# Patient Record
Sex: MALE | Race: BLACK OR AFRICAN AMERICAN
[De-identification: names, ages, dates, MRNs, and addresses within clinical notes are randomized per-mention and may not be internally consistent; named-entity substitution may affect disease eponyms.]

---

## 2024-09-25 ENCOUNTER — NON-APPOINTMENT (OUTPATIENT)
Age: 59
End: 2024-09-25

## 2024-09-26 ENCOUNTER — NON-APPOINTMENT (OUTPATIENT)
Age: 59
End: 2024-09-26

## 2024-09-26 ENCOUNTER — APPOINTMENT (OUTPATIENT)
Dept: UROLOGY | Facility: CLINIC | Age: 59
End: 2024-09-26
Payer: SELF-PAY

## 2024-09-26 VITALS
WEIGHT: 180 LBS | SYSTOLIC BLOOD PRESSURE: 134 MMHG | HEART RATE: 74 BPM | OXYGEN SATURATION: 99 % | DIASTOLIC BLOOD PRESSURE: 92 MMHG

## 2024-09-26 DIAGNOSIS — Z80.42 FAMILY HISTORY OF MALIGNANT NEOPLASM OF PROSTATE: ICD-10-CM

## 2024-09-26 DIAGNOSIS — R93.5 ABNORMAL FINDINGS ON DIAGNOSTIC IMAGING OF OTHER ABDOMINAL REGIONS, INCLUDING RETROPERITONEUM: ICD-10-CM

## 2024-09-26 DIAGNOSIS — R97.20 ELEVATED PROSTATE, SPECIFIC ANTIGEN [PSA]: ICD-10-CM

## 2024-09-26 DIAGNOSIS — E78.00 PURE HYPERCHOLESTEROLEMIA, UNSPECIFIED: ICD-10-CM

## 2024-09-26 PROBLEM — Z00.00 ENCOUNTER FOR PREVENTIVE HEALTH EXAMINATION: Status: ACTIVE | Noted: 2024-09-26

## 2024-09-26 PROCEDURE — G2211 COMPLEX E/M VISIT ADD ON: CPT

## 2024-09-26 PROCEDURE — 99204 OFFICE O/P NEW MOD 45 MIN: CPT

## 2024-09-26 RX ORDER — SIMVASTATIN 20 MG/1
20 TABLET, FILM COATED ORAL
Refills: 0 | Status: ACTIVE | COMMUNITY

## 2024-09-29 PROBLEM — R93.5 ABNORMAL MRI, PELVIS: Status: ACTIVE | Noted: 2024-09-29

## 2024-09-29 PROBLEM — R97.20 ELEVATED PSA: Status: ACTIVE | Noted: 2024-09-29

## 2024-09-29 NOTE — HISTORY OF PRESENT ILLNESS
[FreeTextEntry1] : KIMMIE WILHELM presents to the office today. He is a 59 year-old [facility] man with an elevated PSA of 8.72ng/mL drawn in January. He saw Dr. Brennan in [facility] and his PSA 9.74ng/mL with 14.9% free fraction. In June his PSA was 10.99ng/mL.  MRI was completed in February which showed a prostate volume of 49.4mL with a PI-RADS 3 lesion. He had a biopsy done in [place] about 3 weeks ago but has not yet received the results.   March 2022 his PSA was 4.22ng/mL  His brother is going through the same situation right now. He does not recall any family history of prostate cancer.   He reports a moderate stream with occasional straining.   Denies any hematuria.   He goes back and forth to [place].

## 2024-09-29 NOTE — HISTORY OF PRESENT ILLNESS
[FreeTextEntry1] : KIMMIE WILHELM presents to the office today. He is a 59 year-old [facility] man with an elevated PSA of 8.72ng/mL drawn in January. He saw Dr. Brennan in [facility] and his PSA 9.74ng/mL with 14.9% free fraction. In June his PSA was 10.99ng/mL.  MRI was completed in February which showed a prostate volume of 49.4mL with a PI-RADS 3 lesion. He had a biopsy done in [place] about 3 weeks ago but has not yet received the results.   March 2022 his PSA was 4.22ng/mL  His brother is going through the same situation right now. He does not recall any family history of prostate cancer.   He reports a moderate stream with occasional straining.   Denies any hematuria.   He goes back and forth to [place].

## 2024-09-29 NOTE — ASSESSMENT
[FreeTextEntry1] : I reviewed the patient's MRI report as well as his PSA levels and PSA density.    The patient has known history of PSA elevation and abnormal MRI of the prostate.  His PSA levels more recently have been more concerning and the MRI did show a potentially suspicious lesion.  He did have a biopsy although the results are not available today for review.  I have explained to the patient that I would agree with the performance of the biopsy but without the results it is challenging to make any immediate recommendations to him today.  We did review potentially what could be considered depending on findings.  I explained that for patients with clinically significant prostate cancer, they are often faced with a decision between radical prostatectomy or radiation therapy.  We also reviewed that some patients are potential candidate for focal ablative therapies.  I have also explained that clinically insignificant prostate cancer is most likely followed with active surveillance in most patients.  I briefly reviewed these options with him but explained that we really need to see the biopsy report in order to give him more definitive guidance and recommendations.  He says that he will get in touch with me once he does have the results so we can have a follow-up discussion.

## 2024-10-08 ENCOUNTER — APPOINTMENT (OUTPATIENT)
Dept: UROLOGY | Facility: CLINIC | Age: 59
End: 2024-10-08

## 2024-11-03 PROBLEM — Z12.11 SCREENING FOR COLON CANCER: Status: ACTIVE | Noted: 2024-11-03

## 2024-11-03 PROBLEM — Z78.9 NON-SMOKER: Status: ACTIVE | Noted: 2024-11-03

## 2024-11-04 ENCOUNTER — APPOINTMENT (OUTPATIENT)
Dept: INTERNAL MEDICINE | Facility: CLINIC | Age: 59
End: 2024-11-04

## 2024-11-06 ENCOUNTER — APPOINTMENT (OUTPATIENT)
Dept: FAMILY MEDICINE | Facility: CLINIC | Age: 59
End: 2024-11-06

## 2024-11-21 ENCOUNTER — APPOINTMENT (OUTPATIENT)
Dept: GASTROENTEROLOGY | Facility: CLINIC | Age: 59
End: 2024-11-21